# Patient Record
(demographics unavailable — no encounter records)

---

## 2025-01-09 NOTE — HISTORY OF PRESENT ILLNESS
[de-identified] : per patient had a possible yeast infection a few months ago and took OTC medication but now is having similar  symptoms and states her discharge is what it was like when she was taking the over the counter medication  [FreeTextEntry6] : Had possible yeast infection in October - used OTC monistat vaginal suppository and symptoms resolved Symptoms started again with c/o vaginal itch and vaginal discharge x few days Discharge is chunky and white, but also some green discoloration No Dysuria/Burning urination, but islight ncreased urinary frequency Last menses 3 weeks ago No h/o sexual activity  Denies abdominal pain/back pain No fever Normal appetite, good UOP No vomiting, No diarrhea No h/o UTI

## 2025-01-09 NOTE — DISCUSSION/SUMMARY
[FreeTextEntry1] : Symptomatic treatment - sitz baths Meds: Trial diflucan given resolution of symptoms last time with monistat A&D ointment to vaginal area Insure adequate hydration Handwashing and infection control discussed Return to office if symptoms persist, get worse or febrile

## 2025-01-09 NOTE — PHYSICAL EXAM
[Normal external genitalia] : normal external genitalia [Erythematous labia minora] : nonerythematous labia minora [Erythematous labia majora] : nonerythematous labia majora [Vaginal discharge] : no vaginal discharge

## 2025-01-25 NOTE — HISTORY OF PRESENT ILLNESS
[de-identified] : Congestion, left ear pain, no fever [FreeTextEntry6] : no cough no vomiting, no diarrhea normal appetite

## 2025-01-25 NOTE — PLAN
[TextEntry] : Flonase symptomatic treatment fluids intake handwashing and infection control discussed recheck if worse/not improving

## 2025-01-25 NOTE — PHYSICAL EXAM
[TextEntry] : General:  no acute distress, alert   Ears:  left TM with clear effusion Nose:  pink nasal mucosa  Mouth:  nonerythematous oropharynx  Neck:  supple   Lungs:  clear to auscultation bilaterally  Cardiac:  regular rate and rhythm  Abdomen:  soft, non tender, non distended  Lymphatics:  no abnormal lymph nodes palpated Skin:  warm